# Patient Record
Sex: MALE | Race: WHITE | Employment: FULL TIME | URBAN - METROPOLITAN AREA
[De-identification: names, ages, dates, MRNs, and addresses within clinical notes are randomized per-mention and may not be internally consistent; named-entity substitution may affect disease eponyms.]

---

## 2018-02-05 ENCOUNTER — OFFICE VISIT (OUTPATIENT)
Dept: ORTHOPEDIC SURGERY | Age: 53
End: 2018-02-05

## 2018-02-05 VITALS
DIASTOLIC BLOOD PRESSURE: 91 MMHG | BODY MASS INDEX: 26.22 KG/M2 | HEIGHT: 69 IN | OXYGEN SATURATION: 99 % | HEART RATE: 64 BPM | TEMPERATURE: 97.8 F | WEIGHT: 177 LBS | SYSTOLIC BLOOD PRESSURE: 134 MMHG

## 2018-02-05 DIAGNOSIS — Q66.71 PES CAVUS OF RIGHT FOOT: ICD-10-CM

## 2018-02-05 DIAGNOSIS — M25.371 INSTABILITY OF RIGHT ANKLE JOINT: Primary | ICD-10-CM

## 2018-02-05 NOTE — MR AVS SNAPSHOT
[address] [place] [address], [address] 186 [facility] ([place], [place]) 
[phone] Patient: Keyonna Valderrama MRN: [id number] :1965 Visit Information Date & Time Provider Department Dept. Phone Encounter #  
 2018  3:30 PM Cy Falcon, 27 [facility] [phone] Upcoming Health Maintenance Date Due Hepatitis C Screening 1965 DTaP/Tdap/Td series (1 - Tdap) 10/21/1984 FOBT Q 1 YEAR AGE 50-75 10/21/2013 Influenza Age 5 to Adult 2017 Allergies as of 2018  Review Complete On: 2018 By: Cy Falcon MD  
 No Known Allergies Current Immunizations  Never Reviewed No immunizations on file. Not reviewed this visit You Were Diagnosed With   
  
 Codes Comments Instability of right ankle joint    -  Primary ICD-10-CM: M25.371 ICD-9-CM: 718.87 Vitals BP Pulse Temp Height(growth percentile) Weight(growth percentile) SpO2  
 (!) 134/91 (BP 1 Location: Left arm, BP Patient Position: Sitting) 64 97.8 °F (36.6 °C) 5' 9\" (1.753 m) 177 lb (80.3 kg) 99% BMI Smoking Status 26.14 kg/m2 Never Smoker BMI and BSA Data Body Mass Index Body Surface Area  
 26.14 kg/m 2 1.98 m 2 Your Updated Medication List  
  
Notice  As of 2018  4:44 PM  
 You have not been prescribed any medications. We Performed the Following AMB SUPPLY ORDER [[id number] Custom] Comments:  
 Custom trilaminar orthotic inserts with lateral posting Patient Instructions Please follow up as needed. You are advised to contact us if your condition worsens. Ankle Sprain: Care Instructions Your Care Instructions An ankle sprain can happen when you twist your ankle. The ligaments that support the ankle can get stretched and torn. Often the ankle is swollen and painful. Ankle sprains may take from several weeks to several months to heal. Usually, the more pain and swelling you have, the more severe your ankle sprain is and the longer it will take to heal. You can heal faster and regain strength in your ankle with good home treatment. It is very important to give your ankle time to heal completely, so that you do not easily hurt your ankle again. Follow-up care is a key part of your treatment and safety. Be sure to make and go to all appointments, and call your doctor if you are having problems. It's also a good idea to know your test results and keep a list of the medicines you take. How can you care for yourself at home? · Prop up your foot on pillows as much as possible for the next 3 days. Try to keep your ankle above the level of your heart. This will help reduce the swelling. · Follow your doctor's directions for wearing a splint or elastic bandage. Wrapping the ankle may help reduce or prevent swelling. · Your doctor may give you a splint, a brace, an air stirrup, or another form of ankle support to protect your ankle until it is healed. Wear it as directed while your ankle is healing. Do not remove it unless your doctor tells you to. After your ankle has healed, ask your doctor whether you should wear the brace when you exercise. · Put ice or cold packs on your injured ankle for 10 to 20 minutes at a time. Try to do this every 1 to 2 hours for the next 3 days (when you are awake) or until the swelling goes down. Put a thin cloth between the ice and your skin. · You may need to use crutches until you can walk without pain. If you do use crutches, try to bear some weight on your injured ankle if you can do so without pain. This helps the ankle heal. 
· Take pain medicines exactly as directed. ¨ If the doctor gave you a prescription medicine for pain, take it as prescribed.  
¨ If you are not taking a prescription pain medicine, ask your doctor if you can take an over-the-counter medicine. · If you have been given ankle exercises to do at home, do them exactly as instructed. These can promote healing and help prevent lasting weakness. When should you call for help? Call your doctor now or seek immediate medical care if: 
? · Your pain is getting worse. ? · Your swelling is getting worse. ? · Your splint feels too tight or you are unable to loosen it. ? Watch closely for changes in your health, and be sure to contact your doctor if: 
? · You are not getting better after 1 week. Where can you learn more? Go to http://paty-yared.info/. Enter M794 in the search box to learn more about \"Ankle Sprain: Care Instructions. \" Current as of: March 21, 2017 Content Version: 11.4 © 1638-9082 [employer]. Care instructions adapted under license by [employer] (which disclaims liability or warranty for this information). If you have questions about a medical condition or this instruction, always ask your healthcare professional. [address] any warranty or liability for your use of this information. Introducing [facility]! Christy Nielsen introduces Visual Pro 360 patient portal. Now you can access parts of your medical record, email your doctor's office, and request medication refills online. 1. In your internet browser, go to [email or web address] Platform 2. Click on the First Time User? Click Here link in the Sign In box. You will see the New Member Sign Up page. 3. Enter your Visual Pro 360 Access Code exactly as it appears below. You will not need to use this code after youve completed the sign-up process. If you do not sign up before the expiration date, you must request a new code. · Visual Pro 360 Access Code: [email or web address] Expires: 5/6/2018  4:22 PM 
 
4.  Enter the last four digits of your Social Security Number (xxxx) and Date of Birth (mm/dd/yyyy) as indicated and click Submit. You will be taken to the next sign-up page. 5. Create a Snatch that Jerky ID. This will be your Snatch that Jerky login ID and cannot be changed, so think of one that is secure and easy to remember. 6. Create a Snatch that Jerky password. You can change your password at any time. 7. Enter your Password Reset Question and Answer. This can be used at a later time if you forget your password. 8. Enter your e-mail address. You will receive e-mail notification when new information is available in [address] 9. Click Sign Up. You can now view and download portions of your medical record. 10. Click the Download Summary menu link to download a portable copy of your medical information. If you have questions, please visit the Frequently Asked Questions section of the Snatch that Jerky website. Remember, Snatch that Jerky is NOT to be used for urgent needs. For medical emergencies, dial 911. Now available from your iPhone and Android! Please provide this summary of care documentation to your next provider. If you have any questions after today's visit, please call [phone].

## 2018-02-05 NOTE — PATIENT INSTRUCTIONS
Please follow up as needed. You are advised to contact us if your condition worsens. Ankle Sprain: Care Instructions  Your Care Instructions    An ankle sprain can happen when you twist your ankle. The ligaments that support the ankle can get stretched and torn. Often the ankle is swollen and painful. Ankle sprains may take from several weeks to several months to heal. Usually, the more pain and swelling you have, the more severe your ankle sprain is and the longer it will take to heal. You can heal faster and regain strength in your ankle with good home treatment. It is very important to give your ankle time to heal completely, so that you do not easily hurt your ankle again. Follow-up care is a key part of your treatment and safety. Be sure to make and go to all appointments, and call your doctor if you are having problems. It's also a good idea to know your test results and keep a list of the medicines you take. How can you care for yourself at home? · Prop up your foot on pillows as much as possible for the next 3 days. Try to keep your ankle above the level of your heart. This will help reduce the swelling. · Follow your doctor's directions for wearing a splint or elastic bandage. Wrapping the ankle may help reduce or prevent swelling. · Your doctor may give you a splint, a brace, an air stirrup, or another form of ankle support to protect your ankle until it is healed. Wear it as directed while your ankle is healing. Do not remove it unless your doctor tells you to. After your ankle has healed, ask your doctor whether you should wear the brace when you exercise. · Put ice or cold packs on your injured ankle for 10 to 20 minutes at a time. Try to do this every 1 to 2 hours for the next 3 days (when you are awake) or until the swelling goes down. Put a thin cloth between the ice and your skin. · You may need to use crutches until you can walk without pain.  If you do use crutches, try to bear some weight on your injured ankle if you can do so without pain. This helps the ankle heal.  · Take pain medicines exactly as directed. ¨ If the doctor gave you a prescription medicine for pain, take it as prescribed. ¨ If you are not taking a prescription pain medicine, ask your doctor if you can take an over-the-counter medicine. · If you have been given ankle exercises to do at home, do them exactly as instructed. These can promote healing and help prevent lasting weakness. When should you call for help? Call your doctor now or seek immediate medical care if:  ? · Your pain is getting worse. ? · Your swelling is getting worse. ? · Your splint feels too tight or you are unable to loosen it. ? Watch closely for changes in your health, and be sure to contact your doctor if:  ? · You are not getting better after 1 week. Where can you learn more? Go to http://paty-yared.info/. Enter V831 in the search box to learn more about \"Ankle Sprain: Care Instructions. \"  Current as of: March 21, 2017  Content Version: 11.4  © 8217-0290 [employer]. Care instructions adapted under license by [employer] (which disclaims liability or warranty for this information). If you have questions about a medical condition or this instruction, always ask your healthcare professional. [address] any warranty or liability for your use of this information.

## 2018-02-05 NOTE — PROGRESS NOTES
AMBULATORY PROGRESS NOTE      Patient: Mone Reese             MRN: [id number]     SSN: [id number] Body mass index is 26.14 kg/(m^2). YOB: 1965     AGE: 45 y.o. EX: male    PCP: No primary care provider on file. IMPRESSION/DIAGNOSIS AND TREATMENT PLAN     DIAGNOSES  1. Instability of right ankle joint    2. Pes cavus of right foot        Orders Placed This Encounter    AMB SUPPLY ORDER      Murali Jc understands his diagnoses and the proposed plan. My overall impression is instability of the right ankle with pes cavovarus alignment. The patient is a 47-year-old male who provides a history of spraining his ankle about 20 years ago. He wears a brace when he golfs. Occasionally, when he walks, his ankle wants to give out on him, maybe at least once a month. He admits having no pain at this time. He had x-rays on a CD/DVD that he brought dated December 2017, three views of the right ankle, which showed soft tissue swelling laterally, as well as some varus tilt of the talus. In examining the anterior instability of his right ankle, he has pes cavus alignment. Operative treatment options would include a calcaneal osteotomy, ankle ligament reconstruction. The nonoperative treatment options would include a laterally-posted orthotic, ankle brace, and activity modification. He understands that he is still at risk for recurrent ankle sprains and the concern is that recurrent ankle sprains may develop osteoarthritis long-term, and he may need an ankle arthrodesis and/or total ankle replacement. On his nonweightbearing x-rays from December 2017, I see no signs of arthritis on those films other than anterolateral soft tissue swelling and varus alignment of the ankle on these nonweightbearing, three views of the right ankle. He understands the discussion and does not want any type of operative intervention. We will strive for conservative care, nonoperative care.   Should his symptoms worsen, of course, we will see him sooner. Plan:    1) Continue activity modification as directed. 2) Consider purchasing [employer] Ankle brace. 3) DME Order: Custom Trilaminar Orthotic Inserts with lateral posting. RTO - PRN    HPI AND EXAMINATION     Murali Jc IS A 45 y.o. male who presents to my outpatient office complaining of right ankle pain. Patient reports that on December, 1st, 2017 he sprained his ankle. At this time, he denies any pain or discomfort arising from the ankle sprain. He has h/o gout and had a recent gouty attack which he managed with his PCP. Of note, he mentioned having a severe ankle sprain that another provider stated that he could have surgery for if he were an athlete. This occurred over 10 years ago. He wears a right ankle brace intermittently as needed if he sprains his ankle or begins to experience some discomfort. Patient understand the information provided to him today. Patient has a h/o gout that typically focuses on his great toe. He is on Colchicine. Right ANKLE and FOOT       Gait: normal  Tenderness: no TTP at this time. Cutaneous: No rashes, skin patches, wounds, or abrasions to the lower legs           Warm and Normal color. No regions of expressible drainage. Medial Border of Tibia Region: absent           Skin color, texture, turgor normal. Normal.  Joint Motion: ROM Ankle:Normal , Hindfoot: (ST,TN,CC Normal}, Forefoot toes:Normal  Neurologic Exam: Neuro: Motor: normal 5/5 strength in all tested muscle groups and Sensory : no sensory deficits noted. Contractures: Gastrocnemius or Achilles Contractures absent  Joint / Tendon Stability: No Ankle or Subtalar instability or joint laxity.                        No peroneal sublux ability or dislocation           2+ Anterior Drawer  Alignment: Pes Cavus  Vascular: Normal Pulses/ NL Capillary refill, No evidence of DVT seen on physical exam.   No calf swelling, no tenderness to calf muscles. Lymphatic:  No Evidence of Lymphedema. CHART REVIEW     Past Medical History:   Diagnosis Date    Gout 2011     No current outpatient prescriptions on file. No current facility-administered medications for this visit. No Known Allergies  No past surgical history on file. Social History     Occupational History    Not on file. Social History Main Topics    Smoking status: Never Smoker    Smokeless tobacco: Never Used    Alcohol use 7.2 oz/week     12 Cans of beer per week    Drug use: No    Sexual activity: Yes     Partners: Female     Family History   Problem Relation Age of Onset    Hypertension Brother     Hypertension Brother     Hypertension Brother         REVIEW OF SYSTEMS : 2/5/2018  ALL BELOW ARE Negative except : SEE HPI     CONSTITUTIONAL: denies chills, fatigue, fever, weight change   PSYCH: denies anxiety, depression, irritability or mood swings   ENT: denies - headaches, hearing change, nasal congestion, oral lesions, or sore throat   HEM/ONC denies - bleeding problems, bruising, pallor or swollen lymph nodes   ENDO: denies hot flashes, polydipsia/polyuria or temperature intolerance   RESP: denies - cough, shortness of breath or wheezing   CV: denies - chest pain, edema or palpitations, HERNANDEZ  GI: denies - abdominal pain, change in bowel habits, constipation, diarrhea or nausea/vomiting   : denies - dysuria, hematuria, incontinence, pelvic pain   MSK: denies  - See HPI. NEURO: denies - confusion, headaches, seizures or weakness   DERM: denies - dry skin, hair changes, rash or skin lesion changes  VASCULAR: Peripheral Vascular: No calf pain, vascular vein tenderness to calf pain              No calf throbbing, posterior knee throbbing pain     DIAGNOSTIC IMAGING     No notes on file    Written by Grayson Johns, as dictated by Lynden Eisenmenger, MD. IDr., Lynden Eisenmenger, MD, confirm that all documentation is accurate.

## 2021-07-15 ENCOUNTER — OFFICE VISIT (OUTPATIENT)
Dept: ORTHOPEDIC SURGERY | Age: 56
End: 2021-07-15
Payer: COMMERCIAL

## 2021-07-15 VITALS
HEART RATE: 55 BPM | WEIGHT: 173.4 LBS | TEMPERATURE: 97.8 F | OXYGEN SATURATION: 99 % | RESPIRATION RATE: 16 BRPM | HEIGHT: 69 IN | BODY MASS INDEX: 25.68 KG/M2

## 2021-07-15 DIAGNOSIS — M25.561 ACUTE PAIN OF RIGHT KNEE: Primary | ICD-10-CM

## 2021-07-15 DIAGNOSIS — M22.2X1 PATELLOFEMORAL SYNDROME OF RIGHT KNEE: ICD-10-CM

## 2021-07-15 DIAGNOSIS — Q74.1 PATELLA DYSPLASIA: ICD-10-CM
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
## 2021-07-15 DIAGNOSIS — M85.80 EROSION OF BONE: ICD-10-CM

## 2021-07-15 PROCEDURE — 99203 OFFICE O/P NEW LOW 30 MIN: CPT | Performed by: PHYSICIAN ASSISTANT

## 2021-07-15 NOTE — PROGRESS NOTES
Patient: María Rendon                MRN: [id number]       SSN: [id number]  YOB: 1965        AGE: 60 y.o. SEX: male          PCP: Bertha Culp NP  07/15/21    Chief Complaint   Patient presents with    Knee Pain     right knee pain       HISTORY:  María Rendon is a 60 y.o. male who presents to the office complaining of right knee pain. He has been active as a runner over the past several months and had episode of pain about 2 weeks ago following a run which not did not resolve as a normal overuse pain might. He was subsequently seen through his employer's medical clinic and x-ray of the knee ordered which revealed a anterior lateral irregularity of the patella on the right with patellar erosion. He spoke to his PCP concerning and the PCP indicated that he should follow with orthopedics. Patient's pain has improved overall however he is concerned that there is still a large 11 mm in question patellar erosion which may associate with a neoplasm. Patient has no history of cancer. Pain Assessment  7/15/2021   Location of Pain Knee   Location Modifiers Right   Severity of Pain 0   Quality of Pain -   Duration of Pain -   Frequency of Pain -   Aggravating Factors -   Limiting Behavior -   Relieving Factors -   Result of Injury No   Type of Injury -           No results found for: HBA1C, QAU7QCNU, FNC8ETOW  Weight Metrics 7/15/2021 2/5/2018   Weight 173 lb 6.4 oz 177 lb   BMI 25.61 kg/m2 26.14 kg/m2            Problem List Items Addressed This Visit     None      Visit Diagnoses     Acute pain of right knee    -  Primary    Patella dysplasia        Erosion of bone        Overuse syndrome        Patellofemoral syndrome of right knee              PAST MEDICAL HISTORY:   Past Medical History:   Diagnosis Date    Gout 2011       PAST SURGICAL HISTORY: History reviewed. No pertinent surgical history.     ALLERGIES: No Known Allergies CURRENT MEDICATIONS:  A list of medications prior to the time of admission include:  Prior to Admission medications    Not on File       FAMILY HISTORY:   Family History   Problem Relation Age of Onset    Hypertension Brother     Hypertension Brother     Hypertension Brother        SOCIAL HISTORY:   Social History     Socioeconomic History    Marital status:      Spouse name: Not on file    Number of children: Not on file    Years of education: Not on file    Highest education level: Not on file   Tobacco Use    Smoking status: Never Smoker    Smokeless tobacco: Never Used   Substance and Sexual Activity    Alcohol use: Yes     Alcohol/week: 12.0 standard drinks     Types: 12 Cans of beer per week    Drug use: No    Sexual activity: Yes     Partners: Female     Social Determinants of Health     Financial Resource Strain:     Difficulty of Paying Living Expenses:    Food Insecurity:     Worried About Running Out of Food in the Last Year:     [address] in the Last Year:    Transportation Needs:     Lack of Transportation (Medical):  Lack of Transportation (Non-Medical):    Physical Activity:     Days of Exercise per Week:     Minutes of Exercise per Session:    Stress:     Feeling of Stress :    Social Connections:     Frequency of Communication with Friends and Family:     Frequency of Social Gatherings with Friends and Family:     Attends Taoism Services:     Active Member of Clubs or Organizations:     Attends Club or Organization Meetings:     Marital Status:        ROS:No CP, No SOB, No fever/chills nor night sweats. No headaches, vision abnormalities to include double and oral loss of vision. No hearing abnormalities. Musculoskeletal pain per HPI. Pain is exacerbated positionally. Pt denies h/o spinal surgery, injections, or PT/chiropractor. Self treated with less than adequate relief on oral antiinflammatories. . Pt denies change in bowel or bladder habits.  Pt denies fever, weight loss, or skin changes. PHYSICAL EXAM:    Visit Vitals  Pulse (!) 55   Temp 97.8 °F (36.6 °C) (Temporal)   Resp 16   Ht 5' 9\" (1.753 m)   Wt 173 lb 6.4 oz (78.7 kg)   SpO2 99%   BMI 25.61 kg/m²       Constitutional: Appears well-developed and well-nourished. No distress. Sitting comfortably in the exam room, interacting with conversation with pleasant affect. Skin: Skin over the head, neck, bilateral limbs, and trunk is warm and dry. No rash or erythema noted. Cranial Nerves II-XII grossly intact  HENT: NC/AT. Normal symmetry, bulk and tone of facial and neck musculature. Trachea midline. No discernible thyromegaly or masses. No involuntary movements. Lymphatic: No preauricular, submandibuar, anterior or posterior cervical lymphadenopathy. Psychiatric: The patient is awake, alert, and oriented to person, place and time. Behavior is normal. Thought content normal.   Cardiovascular: No clubbing, cyanosis. No edema bilateral lower extremities. Pulmonary: No tripoding nor accessory muscle recruitment. Breathing normally, no distress, no audible wheezing. Distal cap refill intact at 2/2 Oz UE / LE. Neuro intact Oz UE/LE to noxious stimuli        Ortho Specific exam:      Right knee reveals no warmth, erythema, edema, or ecchymosis. There is a trace effusion. Quad and patellar tendons intact nontender. All supine face active range of motion of the knee noted at 110 degrees -5 degrees. No instability on medial lateral stressing and no pain reproduced. MCL and LCL intact. ACL and PCL intact with no instability or laxity. Negative Kyle sign. No popliteal tenderness. Focal tenderness noted over the lateral anterior aspect of the patella in the area of the aforementioned abnormal radiological finding.     X-ray: Historical [employer] imaging 4 view of the right knee 11 mm anterior lateral irregular patellar erosion/radiolucent aggressive patellar lesion with anterior knee swelling extending to the patellar tendon. IMPRESSION:      ICD-10-CM ICD-9-CM    1. Acute pain of right knee  M25.561 719.46    2. Patella dysplasia  Q74.1 755.64    3. Erosion of bone  M85.80 733.99    4. Overuse syndrome  X50. 3XXA 848.9      E927.3    5. Patellofemoral syndrome of right knee  M22.2X1 719.46         PLAN: Today we discussed alternatives to care to include but not limited to a MRI of the right knee to rule out neoplastic activities. Patient will continue his activities full weightbearing of the right lower extremity. Probably needs to avoid any running or jumping up. He may do cardiovascular though on the elliptical or stationary bicycle. We will see him back once the MRI is available for review and comment. His x-rays were reviewed today copies were not provided however they were demonstrated to the patient. Today all of his questions answered to his satisfaction. Additionally today we discussed the diagnosis of obesity and the importance of weight management for both cardiovascular health. The patient was recommended to decrease carbohydrate and sugar intake. Patient recommended a formal dietary consult which they will consider and return a call to our office. In light of the patient's osteoarthritic findings I am making a recommendation for aerobic exercise to include but not limited to stationary bicycle, elliptical, therapeutic walking with good shoes and or swimming. Patient should avoid any running or jumping. If using the treadmill then recommendation for no elevation and no running or jogging. Walking is improved. No Narcotic indicated today. Patient given pain medication for short term acute pain relief. Goal is to treat patient according to above plan and to ultimately have patient off all pain medications once appropriate.  If chronic pain management is required beyond what is expected for current orthopedic problem, will refer patient to pain management.  was reviewed and will be reviewed with every medication refill request.         Patient provided a reminder for a \"due or due soon\" health maintenance. I have asked the patient to schedule an appointment with their primary care provider for follow-up on general health maintenance concerns. Today all the patient's questions were answered to their satisfaction. Copies of x-rays reviewed if obtained this visit, and provided to patient. Dictation disclaimer:  Please note that this dictation was completed with [employer], the computer voice recognition software. Quite often unanticipated grammatical, syntax, homophones, and other interpretive errors are inadvertently transcribed by the computer software. Please disregard these errors. Please excuse any errors that have escaped final proofreading. Rosie RIVERA, APC, MPAS, PA-C  [facility]

## 2021-07-22 ENCOUNTER — HOSPITAL ENCOUNTER (OUTPATIENT)
Age: 56
Discharge: HOME | End: 2021-07-22
Attending: PHYSICIAN ASSISTANT
Payer: COMMERCIAL

## 2021-07-22 DIAGNOSIS — M25.561 ACUTE PAIN OF RIGHT KNEE: ICD-10-CM

## 2021-07-22 DIAGNOSIS — M85.80 EROSION OF BONE: ICD-10-CM

## 2021-07-22 DIAGNOSIS — Q74.1 PATELLA DYSPLASIA: ICD-10-CM

## 2021-07-22 PROCEDURE — 73721 MRI JNT OF LWR EXTRE W/O DYE: CPT

## 2021-07-27 ENCOUNTER — HOSPITAL ENCOUNTER (OUTPATIENT)
Dept: LAB | Age: 56
Discharge: HOME | End: 2021-07-27
Payer: COMMERCIAL

## 2021-07-27 ENCOUNTER — OFFICE VISIT (OUTPATIENT)
Dept: ORTHOPEDIC SURGERY | Age: 56
End: 2021-07-27
Payer: COMMERCIAL

## 2021-07-27 VITALS
HEIGHT: 69 IN | RESPIRATION RATE: 15 BRPM | OXYGEN SATURATION: 98 % | WEIGHT: 172 LBS | HEART RATE: 73 BPM | BODY MASS INDEX: 25.48 KG/M2

## 2021-07-27 DIAGNOSIS — M25.561 ACUTE PAIN OF RIGHT KNEE: ICD-10-CM

## 2021-07-27 DIAGNOSIS — M25.461 EFFUSION OF RIGHT KNEE: ICD-10-CM

## 2021-07-27 DIAGNOSIS — M25.561 ACUTE PAIN OF RIGHT KNEE: Primary | ICD-10-CM

## 2021-07-27 LAB
APPEARANCE FLD: ABNORMAL
BODY FLD TYPE: NORMAL
COLOR FLD: ABNORMAL
CRYSTALS FLD MICRO: NORMAL
EOSINOPHIL NFR FLD MANUAL: 0 %
LYMPHOCYTES NFR FLD: 63 %
MONOCYTES NFR FLD: 24 %
NEUTROPHILS NFR FLD: 13 %
NEUTS BAND # FLD: 0 %
NUC CELL # FLD: 93 /CU MM
RBC # FLD: 123 /CU MM
SPECIMEN SOURCE FLD: ABNORMAL

## 2021-07-27 PROCEDURE — 99213 OFFICE O/P EST LOW 20 MIN: CPT | Performed by: PHYSICIAN ASSISTANT

## 2021-07-27 PROCEDURE — 20610 DRAIN/INJ JOINT/BURSA W/O US: CPT | Performed by: PHYSICIAN ASSISTANT

## 2021-07-27 PROCEDURE — 89060 EXAM SYNOVIAL FLUID CRYSTALS: CPT

## 2021-07-27 PROCEDURE — 87075 CULTR BACTERIA EXCEPT BLOOD: CPT

## 2021-07-27 PROCEDURE — 87205 SMEAR GRAM STAIN: CPT

## 2021-07-27 PROCEDURE — 89051 BODY FLUID CELL COUNT: CPT

## 2021-07-27 RX ORDER — TRIAMCINOLONE ACETONIDE 40 MG/ML
40 INJECTION, SUSPENSION INTRA-ARTICULAR; INTRAMUSCULAR ONCE
Status: COMPLETED | OUTPATIENT
Start: 2021-07-27 | End: 2021-07-27

## 2021-07-27 RX ADMIN — TRIAMCINOLONE ACETONIDE 40 MG: 40 INJECTION, SUSPENSION INTRA-ARTICULAR; INTRAMUSCULAR at 09:07

## 2021-07-27 NOTE — LETTER
7/27/2021 9:02 AM    Mr. Cat Leal  [address]  [facility] [address]      To Whom It May Concern: The above named patient was seen in our office 7/27/2021. Due to an orthopedic condition, Mr Leni Dumas is unable take advantage of his gym membership. If you have any questions, please do not hesitate to contact our office.           Sincerely,      Javan Mathur PA-C

## 2021-07-27 NOTE — PROGRESS NOTES
Luis Dunlap returns the office with complaint of recurrence of right knee pain and swelling. He brings with him an MRI which reflects below:    Result Information    Status: Final result (Exam End: 7/22/2021 18:02) Provider Status: Reviewed   Study Result    Narrative & Impression   EXAM: MRI KNEE RT WO CONT     CLINICAL INDICATION/HISTORY: 60 years Male. Active runner over the past several  months. Pain which began several weeks ago, not resolving. Possible patellar  fracture or lesion described on outside prior radiographs (radiograph not  available). Additional History: None     COMPARISON: None     TECHNIQUE: Multiplanar, multisequence MR imaging of the right knee without IV  contrast     FINDINGS:      MENISCI  Medial meniscus: Immediate signal in the posterior horn of the medial meniscus  which is not clearly extend to an articular surface and does not meet criteria  for tear. This likely reflects intrasubstance degeneration. Lateral meniscus: Intact     LIGAMENTS  ACL: Intact  PCL: Unremarkable  MCL: There is mild edema about the superficial and deep margins of the  superficial MCL, compatible with grade 1 sprain. There is also mild to moderate  edema in the posterior oblique ligament, likely grade 1 sprain. Intact deep MCL. LCL complex: Focal collection of fluid between the lateral femoral condyle and  iliotibial band which appears separate from the joint space and measures 1.9 x  1.3 cm CC X TV, likely reflecting a synovial cyst or adventitial bursitis. There  is also edema along the deep margin of the iliotibial band, which is suggestive  IT band friction syndrome.     EXTENSOR MECHANISM/TENDONS: Mild distal quadriceps tendinosis. Mild enthesopathy  within the patella. Normal patellar height. Mild edema in the quadriceps fat  pad, likely reactive. Unremarkable infrapatellar and prepatellar femoral fat  pads. The tibial tuberosity-trochlear groove distance is within normal limits.   The medial and lateral patellar retinacula are intact.     JOINT: Trace fluid in the knee joint, which is likely within the region of  normal. 0.6 cm likely small synovial cyst along the posterior aspect of the knee  joint (axial 12).    CARTILAGE:  Patellofemoral: Diffuse grade 3/4 chondral fissuring and chondral thinning  involving the inferior third of the patella involving the patellar apex and  lateral facets. Femoral trochlear articular cartilage is essentially preserved. Medial Compartment: Diffuse grade 2 chondrosis throughout the weightbearing  medial femoral condyle. Lateral Compartment: Mild grade 1/2 chondrosis within the posterior  weightbearing lateral femoral condyle and the posterior lateral tibial plateau. Additional grade 2 chondrosis is present within the early and deep flexion zones  of the lateral femoral condyle.     OSSEOUS STRUCTURES: Coronal obliquely oriented stress fracture involving the  anterior third of the lateral and central patella, with moderate diffuse marrow  edema throughout the patella, most pronounced laterally. Early squaring of the  joint margins.     MUSCULATURE: Unremarkable     SOFT TISSUES/OTHER: Mild semimembranosus tibial collateral ligament bursitis. No  significant Baker's cyst. Mild to moderate prepatellar and superficial  infrapatellar soft tissue swelling without bursitis.     IMPRESSION  1. Subacute patellar stress fracture, coronal-obliquely oriented. 2.  Diffuse grade 3/4 chondral thinning and fissuring within the inferior third  of the patellar apex and lateral patellar facet. Mild medial and lateral  compartment degenerative changes as described. 3.  Mild distal quadriceps tendinosis.   4.  A 1.9 x 0.3 cm fluid collection between the lateral femoral epicondyle and  iliotibial band, which may reflect adventitial bursitis or a synovial cyst.  Additional edema along the deep margin of the iliotibial band which could  reflect IT band friction syndrome in the appropriate clinical setting. 5.  Grade 1 superficial MCL and posterior oblique ligament sprains. 6.  Moderate prepatellar and superficial infrapatellar subcutaneous edema.        Over the last weekend he developed acute swelling to the right knee which has continued through today's visit. He has a known history of gout by serum assay. He attempted to treat his right knee pain with gout abortive medication. He was not successful. No trauma reported to the knee with his recent onset of swelling and pain. On exam today the right knee reveals a 1+ effusion. No warmth, erythema, edema, or ecchymosis. While supine his range of motion is guarded secondary to stiffness in the knee and pain noted to 85 degrees of flexion at its terminal point and -5 degrees to full extension. Patella tracks midline with ranging with moderate crepitation. No instability on medial lateral stressing however valgus stressing did increase medial joint line discomfort as well as origin of the MCL discomfort. Plan: In light of his effusion I am currently recommending an aspiration injection with send for aerobic anaerobic Gram stain cell count and crystals. Activity modification is important based on his stress fracture and quadriceps insertion tendinosis. PT recommended for gentle range of motion and stretching exercises as well as a home exercise program.    Diagnosis:  History of gout  Right knee effusion  Right knee patellar arthritis  Right knee patella stress fracture  Decreased range of motion of the right knee secondary to above  Quadriceps insertion tendinosis  Synovitis acute      Chart reviewed for the following:   Jaiden CHINCHILLA. Cassy JEWELL, have reviewed the History, Physical and updated the Allergic reactions for Murali Haines    TIME OUT performed immediately prior to start of procedure:   Jaiden CHINCHILLA.  Cassy JEWELL, have performed the following reviews on Murali Haines prior to the start of the procedure:            * Patient was identified by name and date of birth   * Agreement on procedure being performed was verified  * Risks and Benefits explained to the patient  * Procedure site verified and marked as necessary  * Patient was positioned for comfort  * Consent was signed and verified             Date of procedure: 07/27/21    Time: 9:06 AM    Procedure performed by:  Javan Mathur PA-C    Provider assisted by: None     Patient assisted by: self    How tolerated by patient: tolerated the procedure well with no complications    Comments: none    Procedural: Using sterile technique and verbal and written consent were obtained appropriate timeout formed patient laying supine right knee flexed at 20 degrees on a bolster 5 cc 1% lidocaine used anesthetize superior lateral interarticular approach. To follow 14 cc of straw-colored blood tinted aspirate removed from the same portal.  Fluid was sent for assay aerobic anaerobic Gram stain cell count crystal.  To follow 1 cc of Kenalog 40 mg/mL mixed with 7 mils of Sensorcaine 0.75% instilled. There were no complications. Patient tolerated procedure well.

## 2021-07-31 LAB
BACTERIA SPEC CULT: NORMAL
BACTERIA SPEC CULT: NORMAL
GRAM STN SPEC: NORMAL
GRAM STN SPEC: NORMAL
SERVICE CMNT-IMP: NORMAL
SERVICE CMNT-IMP: NORMAL

## 2021-08-17 ENCOUNTER — APPOINTMENT (OUTPATIENT)
Dept: PHYSICAL THERAPY | Age: 56
End: 2021-08-17
Attending: PHYSICIAN ASSISTANT